# Patient Record
Sex: FEMALE | Race: BLACK OR AFRICAN AMERICAN | NOT HISPANIC OR LATINO | Employment: OTHER | URBAN - METROPOLITAN AREA
[De-identification: names, ages, dates, MRNs, and addresses within clinical notes are randomized per-mention and may not be internally consistent; named-entity substitution may affect disease eponyms.]

---

## 2019-12-27 ENCOUNTER — HOSPITAL ENCOUNTER (EMERGENCY)
Facility: OTHER | Age: 59
Discharge: HOME | End: 2019-12-27
Attending: EMERGENCY MEDICINE
Payer: MEDICARE

## 2019-12-27 VITALS
RESPIRATION RATE: 18 BRPM | BODY MASS INDEX: 32.25 KG/M2 | HEART RATE: 70 BPM | OXYGEN SATURATION: 97 % | WEIGHT: 160 LBS | HEIGHT: 59 IN | DIASTOLIC BLOOD PRESSURE: 78 MMHG | TEMPERATURE: 98 F | SYSTOLIC BLOOD PRESSURE: 141 MMHG

## 2019-12-27 DIAGNOSIS — M54.32 SCIATICA, LEFT SIDE: Primary | ICD-10-CM

## 2019-12-27 PROCEDURE — 96372 THER/PROPH/DIAG INJ SC/IM: CPT

## 2019-12-27 PROCEDURE — 25000003 PHARM REV CODE 250: Performed by: EMERGENCY MEDICINE

## 2019-12-27 PROCEDURE — 63600175 PHARM REV CODE 636 W HCPCS: Performed by: EMERGENCY MEDICINE

## 2019-12-27 PROCEDURE — 99284 EMERGENCY DEPT VISIT MOD MDM: CPT | Mod: 25

## 2019-12-27 RX ORDER — ETODOLAC 400 MG/1
400 TABLET, FILM COATED ORAL 2 TIMES DAILY PRN
Qty: 20 TABLET | Refills: 0 | Status: SHIPPED | OUTPATIENT
Start: 2019-12-27 | End: 2023-12-06

## 2019-12-27 RX ORDER — CYCLOBENZAPRINE HCL 10 MG
10 TABLET ORAL
Status: COMPLETED | OUTPATIENT
Start: 2019-12-27 | End: 2019-12-27

## 2019-12-27 RX ORDER — FENTANYL 25 UG/1
1 PATCH TRANSDERMAL
COMMUNITY
End: 2023-12-06

## 2019-12-27 RX ORDER — METHYLPREDNISOLONE 4 MG/1
TABLET ORAL
Qty: 1 PACKAGE | Refills: 0 | Status: SHIPPED | OUTPATIENT
Start: 2019-12-27 | End: 2020-01-17

## 2019-12-27 RX ORDER — KETOROLAC TROMETHAMINE 30 MG/ML
60 INJECTION, SOLUTION INTRAMUSCULAR; INTRAVENOUS
Status: COMPLETED | OUTPATIENT
Start: 2019-12-27 | End: 2019-12-27

## 2019-12-27 RX ORDER — OXYCODONE HYDROCHLORIDE 30 MG/1
30 TABLET ORAL 4 TIMES DAILY
COMMUNITY
Start: 2019-12-11 | End: 2023-12-06 | Stop reason: SDUPTHER

## 2019-12-27 RX ORDER — ZOLPIDEM TARTRATE 10 MG/1
10 TABLET ORAL NIGHTLY
COMMUNITY
Start: 2019-12-11 | End: 2023-12-06

## 2019-12-27 RX ADMIN — CYCLOBENZAPRINE HYDROCHLORIDE 10 MG: 10 TABLET, FILM COATED ORAL at 11:12

## 2019-12-27 RX ADMIN — KETOROLAC TROMETHAMINE 60 MG: 30 INJECTION, SOLUTION INTRAMUSCULAR; INTRAVENOUS at 11:12

## 2019-12-27 NOTE — ED NOTES
Reporting hx of chronic lumbar back pain. Reporting pain to L posterior leg x several years, reporting increasing pain in LLE. States she hit LLE below the knee on medial side x several days ago on bed frame. Area without swelling or deformities. Denies calf tenderness. Pt AAOx4 and appropriate at this time. Respirations even and unlabored. No acute distress noted. Ambulatory with steady gait.

## 2019-12-27 NOTE — ED PROVIDER NOTES
"Encounter Date: 12/27/2019    SCRIBE #1 NOTE: I, Edelmira Vilchis, am scribing for, and in the presence of, Dr. Cancino.       History     Chief Complaint   Patient presents with    Leg Pain     Pt c/o increased LLE pain & left hip pain which she reports "freezes in place" X a couple of days, progressively worsening.  Pt reports that she has sciatica & chronic back pain. Pt reports that she is taking pain medication for her back which is not helping her LLE. Pt reports that she did fall several days ago.     Time seen by provider: 11:14 AM    This is a 59 y.o. female who presents with complaint of left leg pain that began 3 days ago. The pain is located to left buttock region and radiates down back leg to foot. Pain has gotten progressively worse. Patient reports intermittent numbness of left great toe. She denies any other numbness or weakness. Denies fever, chills, nausea, or vomiting. Patient has history of sciatica, but states "it has never been this bad." She denies any relief of pain with use of oxycodone. She has been undergoing physical therapy, and is considering radiofrequency ablation of the nerves. Patient denies history of HTN or diabetes. Her PCP is Dr. Espinosa. She has no follow up with pain management.  Patient denies any saddle anesthesia, no urinary or fecal incontinence.    The history is provided by the patient.     Review of patient's allergies indicates:  No Known Allergies  Past Medical History:   Diagnosis Date    Chronic lumbar pain     Insomnia      Past Surgical History:   Procedure Laterality Date    ROTATOR CUFF REPAIR       History reviewed. No pertinent family history.  Social History     Tobacco Use    Smoking status: Never Smoker    Smokeless tobacco: Never Used   Substance Use Topics    Alcohol use: Not Currently    Drug use: Not Currently     Review of Systems   Constitutional: Negative for chills and fever.   HENT: Negative for congestion, sore throat and trouble swallowing.  "   Eyes: Negative for photophobia and visual disturbance.   Respiratory: Negative for shortness of breath.    Cardiovascular: Negative for chest pain.   Gastrointestinal: Negative for abdominal pain, nausea and vomiting.   Genitourinary: Negative for dysuria and hematuria.   Musculoskeletal: Negative for back pain and neck pain.        Positive for left leg pain.   Skin: Negative for rash and wound.   Neurological: Positive for numbness (to leg big toe). Negative for weakness and headaches.   Psychiatric/Behavioral: Negative.    All other systems reviewed and are negative.      Physical Exam     Initial Vitals [12/27/19 0933]   BP Pulse Resp Temp SpO2   138/78 79 18 98.5 °F (36.9 °C) 98 %      MAP       --         Physical Exam    Nursing note and vitals reviewed.  Constitutional: She appears well-developed and well-nourished. She is not diaphoretic. No distress.   HENT:   Head: Normocephalic and atraumatic.   Nose: Nose normal.   Mouth/Throat: No oropharyngeal exudate.   Eyes: Conjunctivae and EOM are normal. Pupils are equal, round, and reactive to light.   Neck: Normal range of motion. Neck supple. No JVD present.   Cardiovascular: Normal rate, regular rhythm, normal heart sounds and intact distal pulses. Exam reveals no gallop and no friction rub.    No murmur heard.  Pulmonary/Chest: Breath sounds normal. No respiratory distress. She has no wheezes. She has no rhonchi. She has no rales. She exhibits no tenderness.   Musculoskeletal: Normal range of motion. She exhibits no edema or tenderness.   Back:  No T-spine or L-spine tenderness palpation or percussion, pain with range of motion, straight leg raise negative bilaterally, neurovascularly intact distally  Left lower extremity:  No pain with range of motion hip, knee exam was normal   Neurological: She is alert and oriented to person, place, and time. She has normal strength.   Skin: Skin is warm and dry. Capillary refill takes less than 2 seconds.    Psychiatric: She has a normal mood and affect. Thought content normal.         ED Course   Procedures  Labs Reviewed - No data to display       Imaging Results    None          Medical Decision Making:   History:   Old Medical Records: I decided to obtain old medical records.  Differential Diagnosis:   Cauda equina syndrome, diskitis/osteomyelitis, epidural/paraspinal abscess, AAA, aortic dissection, post-op/hardware infection, trauma/vertebral fracture, spinal cord injury, disc herniation, spinal stenosis, sciatica, radiculopathy, neoplasm, lumbar muscle strain, muscle spasm, neuropathic pain, UTI/pyelonephritis, nephrolithiasis.    ED Management:  After complete evaluation, including thorough history and physical exam, the patient's symptoms are most likely due to acute worsening of her sciatica. There are no concerning features of bilateral weakness, bowel/bladder incontinence, significant new motor/sensory deficits, or saddle anesthesia to suggest acute cauda equina syndrome. On physical exam, there is no focal midline tenderness or evidence of significant trauma to suggest fracture or injury. There is no fever, immunocompromise, history of recent surgery, or erythema/fluctuance to suggest epidural hematoma, infection, or abscess. After taking into careful account the patient's historical factors, physical exam findings, empirical and objective data obtained on ED workup, the patient appears to be low risk for an emergent medical condition. I feel it is safe and appropriate at this time for the patient to be discharged for follow up and re-evaluation as detailed in the discharge instructions. I have discussed the specifics of the workup with the patient/guardian and the patient/guardian has verbalized understanding of the details of the workup, the diagnosis, the treatment plan, and the need for outpatient follow-up.  Although the patient has no emergent etiology today this does not preclude the development of  an emergent condition some in addition, I have advised the patient that they can return to the ED and/or activated EMS at any time with worsening of her symptoms, change of their symptoms, or with any other medical complaints.  Patient's/guardian understands the emergency department visit today was primarily to address immediate concerns and to rule out emergent causes of the symptoms that may require further workup and evaluation as an outpatient.  All questions addressed and patient's/guardian given discharge instructions and follow-up information.  Patient improved with treatment in the emergency department and is comfortable going home.  Educated the patient on warning signs and symptoms for which they must seek immediate medical attention.  I emphasized the importance of followup.  Patient understands that the emergency visit today is primarily to address immediate concerns and to rule out emergent cause of symptoms and that they may require further workup and evaluation as an outpatient. All questions addressed and patient given discharge instructions and followup information.               Scribe Attestation:   Scribe #1: I performed the above scribed service and the documentation accurately describes the services I performed. I attest to the accuracy of the note.    Attending Attestation:           Physician Attestation for Scribe:  Physician Attestation Statement for Scribe #1: I, Dr. Cancino, reviewed documentation, as scribed by Edelmira Vilchis in my presence, and it is both accurate and complete.                               Clinical Impression:     1. Sciatica, left side                              Francesco Cancino MD  12/27/19 2208